# Patient Record
Sex: MALE | Race: WHITE | Employment: FULL TIME | URBAN - METROPOLITAN AREA
[De-identification: names, ages, dates, MRNs, and addresses within clinical notes are randomized per-mention and may not be internally consistent; named-entity substitution may affect disease eponyms.]

---

## 2017-06-28 LAB — DIABETIC RETINOPATHY: NORMAL

## 2019-10-16 ENCOUNTER — HOSPITAL ENCOUNTER (OUTPATIENT)
Dept: ULTRASOUND IMAGING | Age: 49
Discharge: HOME | End: 2019-10-16
Payer: COMMERCIAL

## 2019-10-16 DIAGNOSIS — N20.0 KIDNEY STONE: ICD-10-CM

## 2019-10-16 PROCEDURE — 76775 US EXAM ABDO BACK WALL LIM: CPT

## 2020-06-07 ENCOUNTER — APPOINTMENT (OUTPATIENT)
Dept: GENERAL RADIOLOGY | Age: 50
End: 2020-06-07
Payer: COMMERCIAL

## 2020-06-07 ENCOUNTER — HOSPITAL ENCOUNTER (EMERGENCY)
Age: 50
Discharge: HOME | End: 2020-06-07
Attending: EMERGENCY MEDICINE
Payer: COMMERCIAL

## 2020-06-07 VITALS
TEMPERATURE: 97.7 F | WEIGHT: 185 LBS | HEART RATE: 84 BPM | BODY MASS INDEX: 24.52 KG/M2 | SYSTOLIC BLOOD PRESSURE: 170 MMHG | RESPIRATION RATE: 18 BRPM | OXYGEN SATURATION: 97 % | HEIGHT: 73 IN | DIASTOLIC BLOOD PRESSURE: 94 MMHG

## 2020-06-07 LAB
ALBUMIN SERPL-MCNC: 4.3 GM/DL (ref 3.4–5)
ALP BLD-CCNC: 81 IU/L (ref 40–129)
ALT SERPL-CCNC: 14 U/L (ref 10–40)
ANION GAP SERPL CALCULATED.3IONS-SCNC: 11 MMOL/L (ref 4–16)
AST SERPL-CCNC: 16 IU/L (ref 15–37)
BACTERIA: NEGATIVE /HPF
BASOPHILS ABSOLUTE: 0 K/CU MM
BASOPHILS RELATIVE PERCENT: 0.5 % (ref 0–1)
BETA-HYDROXYBUTYRATE: 1.4 MG/DL (ref 0–3)
BILIRUB SERPL-MCNC: 0.4 MG/DL (ref 0–1)
BILIRUBIN URINE: NEGATIVE MG/DL
BLOOD, URINE: ABNORMAL
BUN BLDV-MCNC: 12 MG/DL (ref 6–23)
CALCIUM SERPL-MCNC: 9 MG/DL (ref 8.3–10.6)
CHLORIDE BLD-SCNC: 100 MMOL/L (ref 99–110)
CLARITY: CLEAR
CO2: 27 MMOL/L (ref 21–32)
COLOR: YELLOW
CREAT SERPL-MCNC: 0.7 MG/DL (ref 0.9–1.3)
DIFFERENTIAL TYPE: ABNORMAL
EOSINOPHILS ABSOLUTE: 0.1 K/CU MM
EOSINOPHILS RELATIVE PERCENT: 2.1 % (ref 0–3)
GFR AFRICAN AMERICAN: >60 ML/MIN/1.73M2
GFR NON-AFRICAN AMERICAN: >60 ML/MIN/1.73M2
GLUCOSE BLD-MCNC: 419 MG/DL (ref 70–99)
GLUCOSE, URINE: >500 MG/DL
HCT VFR BLD CALC: 39.3 % (ref 42–52)
HEMOGLOBIN: 13.4 GM/DL (ref 13.5–18)
IMMATURE NEUTROPHIL %: 1 % (ref 0–0.43)
KETONES, URINE: ABNORMAL MG/DL
LEUKOCYTE ESTERASE, URINE: NEGATIVE
LYMPHOCYTES ABSOLUTE: 1.5 K/CU MM
LYMPHOCYTES RELATIVE PERCENT: 25.3 % (ref 24–44)
MAGNESIUM: 2.4 MG/DL (ref 1.8–2.4)
MCH RBC QN AUTO: 29.1 PG (ref 27–31)
MCHC RBC AUTO-ENTMCNC: 34.1 % (ref 32–36)
MCV RBC AUTO: 85.4 FL (ref 78–100)
MONOCYTES ABSOLUTE: 0.5 K/CU MM
MONOCYTES RELATIVE PERCENT: 7.9 % (ref 0–4)
NITRITE URINE, QUANTITATIVE: NEGATIVE
NUCLEATED RBC %: 0 %
PDW BLD-RTO: 12 % (ref 11.7–14.9)
PH, URINE: 5 (ref 5–8)
PLATELET # BLD: 215 K/CU MM (ref 140–440)
PMV BLD AUTO: 9.7 FL (ref 7.5–11.1)
POTASSIUM SERPL-SCNC: 5 MMOL/L (ref 3.5–5.1)
PRO-BNP: 51.02 PG/ML
PROTEIN UA: NEGATIVE MG/DL
RBC # BLD: 4.6 M/CU MM (ref 4.6–6.2)
RBC URINE: 10 /HPF (ref 0–3)
SEGMENTED NEUTROPHILS ABSOLUTE COUNT: 3.7 K/CU MM
SEGMENTED NEUTROPHILS RELATIVE PERCENT: 63.2 % (ref 36–66)
SODIUM BLD-SCNC: 138 MMOL/L (ref 135–145)
SPECIFIC GRAVITY UA: 1.03 (ref 1–1.03)
TOTAL CK: 89 IU/L (ref 38–174)
TOTAL IMMATURE NEUTOROPHIL: 0.06 K/CU MM
TOTAL NUCLEATED RBC: 0 K/CU MM
TOTAL PROTEIN: 7.4 GM/DL (ref 6.4–8.2)
TRICHOMONAS: ABNORMAL /HPF
TROPONIN T: <0.01 NG/ML
TSH HIGH SENSITIVITY: 1.46 UIU/ML (ref 0.27–4.2)
UROBILINOGEN, URINE: 1 MG/DL (ref 0.2–1)
WBC # BLD: 5.8 K/CU MM (ref 4–10.5)
WBC UA: 1 /HPF (ref 0–2)

## 2020-06-07 PROCEDURE — 84484 ASSAY OF TROPONIN QUANT: CPT

## 2020-06-07 PROCEDURE — 81001 URINALYSIS AUTO W/SCOPE: CPT

## 2020-06-07 PROCEDURE — 80053 COMPREHEN METABOLIC PANEL: CPT

## 2020-06-07 PROCEDURE — 73030 X-RAY EXAM OF SHOULDER: CPT

## 2020-06-07 PROCEDURE — 82010 KETONE BODYS QUAN: CPT

## 2020-06-07 PROCEDURE — 83735 ASSAY OF MAGNESIUM: CPT

## 2020-06-07 PROCEDURE — 85025 COMPLETE CBC W/AUTO DIFF WBC: CPT

## 2020-06-07 PROCEDURE — 93005 ELECTROCARDIOGRAM TRACING: CPT | Performed by: PHYSICIAN ASSISTANT

## 2020-06-07 PROCEDURE — 6370000000 HC RX 637 (ALT 250 FOR IP): Performed by: EMERGENCY MEDICINE

## 2020-06-07 PROCEDURE — 99284 EMERGENCY DEPT VISIT MOD MDM: CPT

## 2020-06-07 PROCEDURE — 83880 ASSAY OF NATRIURETIC PEPTIDE: CPT

## 2020-06-07 PROCEDURE — 93010 ELECTROCARDIOGRAM REPORT: CPT | Performed by: INTERNAL MEDICINE

## 2020-06-07 PROCEDURE — 36415 COLL VENOUS BLD VENIPUNCTURE: CPT

## 2020-06-07 PROCEDURE — 84443 ASSAY THYROID STIM HORMONE: CPT

## 2020-06-07 PROCEDURE — 71045 X-RAY EXAM CHEST 1 VIEW: CPT

## 2020-06-07 PROCEDURE — 82805 BLOOD GASES W/O2 SATURATION: CPT

## 2020-06-07 PROCEDURE — 82550 ASSAY OF CK (CPK): CPT

## 2020-06-07 RX ORDER — LIDOCAINE 4 G/G
1 PATCH TOPICAL DAILY
Status: DISCONTINUED | OUTPATIENT
Start: 2020-06-07 | End: 2020-06-07

## 2020-06-07 RX ORDER — CYCLOBENZAPRINE HCL 10 MG
10 TABLET ORAL ONCE
Status: COMPLETED | OUTPATIENT
Start: 2020-06-07 | End: 2020-06-07

## 2020-06-07 RX ORDER — LIDOCAINE 4 G/G
1 PATCH TOPICAL DAILY
Status: DISCONTINUED | OUTPATIENT
Start: 2020-06-07 | End: 2020-06-07 | Stop reason: HOSPADM

## 2020-06-07 RX ORDER — NAPROXEN 500 MG/1
500 TABLET ORAL 2 TIMES DAILY
Qty: 60 TABLET | Refills: 0 | Status: SHIPPED | OUTPATIENT
Start: 2020-06-07

## 2020-06-07 RX ORDER — ACETAMINOPHEN 500 MG
1000 TABLET ORAL ONCE
Status: COMPLETED | OUTPATIENT
Start: 2020-06-07 | End: 2020-06-07

## 2020-06-07 RX ORDER — ACETAMINOPHEN 325 MG/1
650 TABLET ORAL EVERY 6 HOURS PRN
Qty: 120 TABLET | Refills: 3 | Status: SHIPPED | OUTPATIENT
Start: 2020-06-07

## 2020-06-07 RX ORDER — CYCLOBENZAPRINE HCL 10 MG
10 TABLET ORAL NIGHTLY PRN
Qty: 10 TABLET | Refills: 0 | Status: SHIPPED | OUTPATIENT
Start: 2020-06-07 | End: 2020-06-17

## 2020-06-07 RX ORDER — 0.9 % SODIUM CHLORIDE 0.9 %
1000 INTRAVENOUS SOLUTION INTRAVENOUS ONCE
Status: DISCONTINUED | OUTPATIENT
Start: 2020-06-07 | End: 2020-06-07 | Stop reason: HOSPADM

## 2020-06-07 RX ORDER — NAPROXEN 250 MG/1
500 TABLET ORAL ONCE
Status: COMPLETED | OUTPATIENT
Start: 2020-06-07 | End: 2020-06-07

## 2020-06-07 RX ORDER — LIDOCAINE 50 MG/G
1 PATCH TOPICAL DAILY
Qty: 30 PATCH | Refills: 0 | Status: SHIPPED | OUTPATIENT
Start: 2020-06-07 | End: 2022-06-12

## 2020-06-07 RX ORDER — ASPIRIN 81 MG/1
324 TABLET, CHEWABLE ORAL ONCE
Status: COMPLETED | OUTPATIENT
Start: 2020-06-07 | End: 2020-06-07

## 2020-06-07 RX ADMIN — ASPIRIN 81 MG 324 MG: 81 TABLET ORAL at 12:24

## 2020-06-07 RX ADMIN — NAPROXEN 500 MG: 250 TABLET ORAL at 12:24

## 2020-06-07 RX ADMIN — ACETAMINOPHEN 1000 MG: 500 TABLET ORAL at 12:24

## 2020-06-07 RX ADMIN — CYCLOBENZAPRINE 10 MG: 10 TABLET, FILM COATED ORAL at 12:24

## 2020-06-07 ASSESSMENT — ENCOUNTER SYMPTOMS
GASTROINTESTINAL NEGATIVE: 1
ALLERGIC/IMMUNOLOGIC NEGATIVE: 1
EYES NEGATIVE: 1
RESPIRATORY NEGATIVE: 1

## 2020-06-07 ASSESSMENT — PAIN DESCRIPTION - LOCATION: LOCATION: SHOULDER

## 2020-06-07 ASSESSMENT — PAIN SCALES - GENERAL
PAINLEVEL_OUTOF10: 8
PAINLEVEL_OUTOF10: 8

## 2020-06-07 ASSESSMENT — PAIN DESCRIPTION - DESCRIPTORS: DESCRIPTORS: ACHING

## 2020-06-07 ASSESSMENT — PAIN DESCRIPTION - PAIN TYPE: TYPE: ACUTE PAIN

## 2020-06-07 ASSESSMENT — PAIN DESCRIPTION - ORIENTATION: ORIENTATION: LEFT

## 2020-06-07 NOTE — ED NOTES
Bed: ED-31  Expected date:   Expected time:   Means of arrival:   Comments:  Triage- Ronald Hartman RN  06/07/20 [id number]

## 2020-06-07 NOTE — ED PROVIDER NOTES
[facility]      TRIAGE CHIEF COMPLAINT:   Shoulder Pain      [place]:  Hitesh Glover is a 52 y.o. male that presents with complaint of left shoulder pain. Patient's been having left shoulder pain since Monday after he got back from [place]. He denies any fevers nausea vomiting chest pain shortness of breath weakness numbness tingling trauma headache states it feels like a spasm he is been taking ibuprofen with no relief. Denies other questions or concerns he is right-handed. No other questions or concerns. REVIEW OF SYSTEMS:  At least 10 systems reviewed and otherwise acutely negative except as in the [address] Review of Systems   Constitutional: Negative. HENT: Negative. Eyes: Negative. Respiratory: Negative. Cardiovascular: Negative. Gastrointestinal: Negative. Endocrine: Negative. Genitourinary: Negative. Musculoskeletal: Positive for arthralgias and myalgias. Skin: Negative. Allergic/Immunologic: Negative. Neurological: Negative. Hematological: Negative. Psychiatric/Behavioral: Negative. All other systems reviewed and are negative.       Past Medical History:   Diagnosis Date    Back pain 1990    Diabetes mellitus, type 2 ([facility] [address] 5/14/2014    Hyperlipidemia     Kidney stones 4/2011     Past Surgical History:   Procedure Laterality Date    VASECTOMY  1/2009     Family History   Problem Relation Age of Onset    Diabetes Father     Hypertension Father     Heart Disease Father     Stroke Father     Liver Disease Father     Obesity Father     Glaucoma Father      Social History     Socioeconomic History    Marital status:      Spouse name: Not on file    Number of children: Not on file    Years of education: Not on file    Highest education level: Not on file   Occupational History    Not on file   Social Needs    Financial resource strain: Not on file    Food insecurity     Worry: Not on file     Inability: Not on file   [place] tenderness. Abdominal:      General: Bowel sounds are normal. There is no distension. Palpations: Abdomen is soft. There is no mass. Tenderness: There is no abdominal tenderness. There is no guarding or rebound. Negative signs include Van's sign, Rovsing's sign and McBurney's sign. Hernia: No hernia is present. Musculoskeletal:         General: Tenderness present. No swelling or signs of injury. Left shoulder: He exhibits decreased range of motion, tenderness, swelling, pain and spasm. He exhibits no effusion, no crepitus, no deformity, no laceration, normal pulse and normal strength. Left elbow: Normal.      Left wrist: Normal.      Cervical back: Normal.      Thoracic back: Normal.        Arms:       Right lower leg: No edema. Left lower leg: No edema. Skin:     General: Skin is warm. Coloration: Skin is not jaundiced or pale. Findings: No bruising, erythema, lesion or rash. Neurological:      General: No focal deficit present. Mental Status: He is alert and oriented to person, place, and time. GCS: GCS eye subscore is 4. GCS verbal subscore is 5. GCS motor subscore is 6. Cranial Nerves: Cranial nerves are intact. No cranial nerve deficit, dysarthria or facial asymmetry. Sensory: Sensation is intact. No sensory deficit. Motor: Motor function is intact. No weakness, tremor, atrophy, abnormal muscle tone or seizure activity. Coordination: Coordination is intact. Coordination normal.   Psychiatric:         Mood and Affect: Mood normal.         Behavior: Behavior normal. Behavior is cooperative. Thought Content:  Thought content normal.         Judgment: Judgment normal.           I have reviewed andinterpreted all of the currently available lab results from this visit (if applicable):    Results for orders placed or performed during the hospital encounter of 06/07/20   CBC Auto Differential   Result Value Ref Range    WBC 5.8 4.0 - 10.5 K/CU MM    RBC 4.60 4.6 - 6.2 M/CU MM    Hemoglobin 13.4 (L) 13.5 - 18.0 GM/DL    Hematocrit 39.3 (L) 42 - 52 %    MCV 85.4 78 - 100 FL    MCH 29.1 27 - 31 PG    MCHC 34.1 32.0 - 36.0 %    RDW 12.0 11.7 - 14.9 %    Platelets 632 739 - 475 K/CU MM    MPV 9.7 7.5 - 11.1 FL    Differential Type AUTOMATED DIFFERENTIAL     Segs Relative 63.2 36 - 66 %    Lymphocytes % 25.3 24 - 44 %    Monocytes % 7.9 (H) 0 - 4 %    Eosinophils % 2.1 0 - 3 %    Basophils % 0.5 0 - 1 %    Segs Absolute 3.7 K/CU MM    Lymphocytes Absolute 1.5 K/CU MM    Monocytes Absolute 0.5 K/CU MM    Eosinophils Absolute 0.1 K/CU MM    Basophils Absolute 0.0 K/CU MM    Nucleated RBC % 0.0 %    Total Nucleated RBC 0.0 K/CU MM    Total Immature Neutrophil 0.06 K/CU MM    Immature Neutrophil % 1.0 (H) 0 - 0.43 %   CMP   Result Value Ref Range    Sodium 138 135 - 145 MMOL/L    Potassium 5.0 3.5 - 5.1 MMOL/L    Chloride 100 99 - 110 mMol/L    CO2 27 21 - 32 MMOL/L    BUN 12 6 - 23 MG/DL    CREATININE 0.7 (L) 0.9 - 1.3 MG/DL    Glucose 419 (HH) 70 - 99 MG/DL    Calcium 9.0 8.3 - 10.6 MG/DL    Alb 4.3 3.4 - 5.0 GM/DL    Total Protein 7.4 6.4 - 8.2 GM/DL    Total Bilirubin 0.4 0.0 - 1.0 MG/DL    ALT 14 10 - 40 U/L    AST 16 15 - 37 IU/L    Alkaline Phosphatase 81 40 - 129 IU/L    GFR Non-African American >60 >60 mL/min/1.73m2    GFR African American >60 >60 mL/min/1.73m2    Anion Gap 11 4 - 16   Troponin   Result Value Ref Range    Troponin T <0.010 <0.01 NG/ML   CK   Result Value Ref Range    Total CK 89 38 - 174 IU/L   Brain Natriuretic Peptide   Result Value Ref Range    Pro-BNP 51.02 <300 PG/ML   Magnesium   Result Value Ref Range    Magnesium 2.4 1.8 - 2.4 mg/dl   TSH without Reflex   Result Value Ref Range    TSH, High Sensitivity 1.460 0.270 - 4.20 uIu/ml   Urinalysis   Result Value Ref Range    Color, UA YELLOW YELLOW    Clarity, UA CLEAR CLEAR    Glucose, Urine >500 (A) NEGATIVE MG/DL    Bilirubin Urine NEGATIVE NEGATIVE MG/DL    Ketones, Urine

## 2020-06-17 LAB
EKG ATRIAL RATE: 83 BPM
EKG DIAGNOSIS: NORMAL
EKG P AXIS: 28 DEGREES
EKG P-R INTERVAL: 154 MS
EKG Q-T INTERVAL: 348 MS
EKG QRS DURATION: 86 MS
EKG QTC CALCULATION (BAZETT): 408 MS
EKG R AXIS: 27 DEGREES
EKG T AXIS: 42 DEGREES
EKG VENTRICULAR RATE: 83 BPM

## 2020-09-04 ENCOUNTER — HOSPITAL ENCOUNTER (EMERGENCY)
Age: 50
Discharge: HOME | End: 2020-09-04
Payer: COMMERCIAL

## 2020-09-04 VITALS
HEIGHT: 73 IN | BODY MASS INDEX: 24.39 KG/M2 | OXYGEN SATURATION: 97 % | TEMPERATURE: 97.7 F | DIASTOLIC BLOOD PRESSURE: 89 MMHG | WEIGHT: 184 LBS | HEART RATE: 88 BPM | SYSTOLIC BLOOD PRESSURE: 132 MMHG | RESPIRATION RATE: 14 BRPM

## 2020-09-04 PROCEDURE — 6370000000 HC RX 637 (ALT 250 FOR IP): Performed by: PHYSICIAN ASSISTANT

## 2020-09-04 PROCEDURE — 99282 EMERGENCY DEPT VISIT SF MDM: CPT

## 2020-09-04 RX ORDER — CEPHALEXIN 500 MG/1
500 CAPSULE ORAL 4 TIMES DAILY
Qty: 28 CAPSULE | Refills: 0 | Status: SHIPPED | OUTPATIENT
Start: 2020-09-04 | End: 2020-09-11

## 2020-09-04 RX ORDER — CIPROFLOXACIN HYDROCHLORIDE 3.5 MG/ML
1 SOLUTION/ DROPS TOPICAL
Status: DISCONTINUED | OUTPATIENT
Start: 2020-09-04 | End: 2020-09-04

## 2020-09-04 RX ORDER — NEOMYCIN SULFATE, POLYMYXIN B SULFATE AND HYDROCORTISONE 10; 3.5; 1 MG/ML; MG/ML; [USP'U]/ML
3 SUSPENSION/ DROPS AURICULAR (OTIC) ONCE
Status: COMPLETED | OUTPATIENT
Start: 2020-09-04 | End: 2020-09-04

## 2020-09-04 RX ORDER — CEPHALEXIN 250 MG/1
500 CAPSULE ORAL ONCE
Status: COMPLETED | OUTPATIENT
Start: 2020-09-04 | End: 2020-09-04

## 2020-09-04 RX ADMIN — NEOMYCIN SULFATE, POLYMYXIN B SULFATE AND HYDROCORTISONE 3 DROP: 10; 3.5; 1 SUSPENSION/ DROPS AURICULAR (OTIC) at 02:59

## 2020-09-04 RX ADMIN — CEPHALEXIN 500 MG: 250 CAPSULE ORAL at 02:59

## 2020-09-04 ASSESSMENT — PAIN DESCRIPTION - DESCRIPTORS: DESCRIPTORS: PRESSURE

## 2020-09-04 ASSESSMENT — PAIN DESCRIPTION - LOCATION: LOCATION: EAR

## 2020-09-04 ASSESSMENT — PAIN SCALES - GENERAL: PAINLEVEL_OUTOF10: 2

## 2020-09-04 ASSESSMENT — PAIN DESCRIPTION - PAIN TYPE: TYPE: ACUTE PAIN

## 2020-09-04 ASSESSMENT — PAIN DESCRIPTION - ORIENTATION: ORIENTATION: LEFT

## 2020-09-04 NOTE — ED PROVIDER NOTES
Relationship status: Not on file    Intimate partner violence     Fear of current or ex partner: Not on file     Emotionally abused: Not on file     Physically abused: Not on file     Forced sexual activity: Not on file   Other Topics Concern    Not on file   Social History Narrative    Not on file     Current Facility-Administered Medications   Medication Dose Route Frequency Provider Last Rate Last Dose    triamcinolone acetonide (KENALOG-40) injection 80 mg  80 mg Intra-articular Once Nai Madrid MD         Current Outpatient Medications   Medication Sig Dispense Refill    cephALEXin (KEFLEX) 500 MG capsule Take 1 capsule by mouth 4 times daily for 7 days 28 capsule 0    neomycin-polymyxin-hydrocortisone (CORTISPORIN) 3.5-74166-9 otic solution Place 4 drops into the left ear 3 times daily for 10 days Instill into L Ear 1 each 0    naproxen (NAPROSYN) 500 MG tablet Take 1 tablet by mouth 2 times daily 60 tablet 0    acetaminophen (TYLENOL) 325 MG tablet Take 2 tablets by mouth every 6 hours as needed for Pain 120 tablet 3    lidocaine (LIDODERM) 5 % Place 1 patch onto the skin daily 12 hours on, 12 hours off. 30 patch 0    doxycycline (MONODOX) 100 MG capsule Take 1 capsule by mouth 2 times daily  0    glimepiride (AMARYL) 4 MG tablet Take 1 tablet by mouth every morning 30 tablet 0    metFORMIN (GLUCOPHAGE) 500 MG tablet Take 1 tablet by mouth 2 times daily (with meals) 60 tablet 0    glucose blood VI test strips (FREESTYLE LITE) strip Apply 1 each topically 2 times daily 100 each 11    Lancets MISC Test 3 times per day 100 each 11    naproxen (NAPROSYN) 500 MG tablet Take 1 tablet by mouth 2 times daily (with meals) 60 tablet 0    Alcohol Swabs 70 % PADS Use 3 times per day 100 each 11    glucose monitoring kit (FREESTYLE) monitoring kit 1 kit by Does not apply route daily as needed.  1 kit 0     Allergies   Allergen Reactions    Amoxicillin     Levofloxacin     Sulfa Antibiotics     Sulfate        Nursing Notes Reviewed    Physical Exam:  ED Triage Vitals   Enc Vitals Group      BP 09/04/20 0016 (!) 147/99      Pulse 09/04/20 0016 92      Resp 09/04/20 0016 14      Temp 09/04/20 0016 97.7 °F (36.5 °C)      Temp Source 09/04/20 0016 Oral      SpO2 09/04/20 0016 97 %      Weight 09/04/20 0007 184 lb (83.5 kg)      Height 09/04/20 0007 6' 1\" (1.854 m)      Head Circumference --       Peak Flow --       Pain Score --       Pain Loc --       Pain Edu? --       Excl. in [address]? --      General :Patient is awake alert oriented person place and time no acute distress nontoxic appearing  HEENT: Pupils are equally round and reactive to light extraocular motors are intact conjunctivae clear sclerae white there is no injection no icterus. Nose without any rhinorrhea or epistaxis. Oral mucosa is moist no exudate buccal mucosa shows no ulcerations. Uvula is midline. Left mastoid nontender no tragal tenderness middle ear space purulent. Auditory canal purulent. Mild edema noted. Neck: Neck is supple full range of motion trachea midline thyroid nonpalpable  Cardiac: Heart regular rate rhythm no murmurs rubs clicks or gallops  Lungs: There is no use of accessory muscles no nasal flaring identified. Neuro: Motor intact sensory intact cranial nerves II through XII are intact level of consciousness is normal cerebellar function is normal reflexes are grossly normal. No evidence of incontinence or loss of bowel or bladder no saddle anesthesia noted Lymphatic: There is no submandibular or cervical adenopathy appreciated. I have reviewed and interpreted all of the currently available lab results from this visit (if applicable):  No results found for this visit on 09/04/20.    Radiographs (if obtained):  [] The following radiograph was interpreted by myself in the absence of a radiologist:   [] Radiologist's Report Reviewed:  No orders to display       EKG (if obtained):   Please See Note of attending physician for EKG interpretation. Chart review shows recent radiograph(s):  No results found. MDM:   Patient has otitis externa. Causing him to have decreased acuity likely secondary to purulence. P.o. and topical antibiotics are provided. No mastoiditis no systemic infection. I low suspicion of malignant otitis        I independently managed patient today in the ED        BP (!) 147/99   Pulse 92   Temp 97.7 °F (36.5 °C) (Oral)   Resp 14   Ht 6' 1\" (1.854 m)   Wt 184 lb (83.5 kg)   SpO2 97%   BMI 24.28 kg/m²       Clinical Impression:  1. Infective otitis externa of left ear        Disposition referral (if applicable):  Willam Salcido MD  [id number]  [phone]    In 2 days      Disposition medications (if applicable):  New Prescriptions    CEPHALEXIN (KEFLEX) 500 MG CAPSULE    Take 1 capsule by mouth 4 times daily for 7 days    NEOMYCIN-POLYMYXIN-HYDROCORTISONE (CORTISPORIN) 3.5-99462-2 OTIC SOLUTION    Place 4 drops into the left ear 3 times daily for 10 days Instill into L Ear         Comment: Please note this report has been produced using speech recognition software and may contain errors related to that system including errors in grammar, punctuation, and spelling, as well as words and phrases that may be inappropriate. If there are any questions or concerns please feel free to contact the dictating provider for clarification.       Harjit Guzman, [address] [place] [address]  09/04/20 9548

## 2020-09-04 NOTE — ED PROVIDER NOTES
As the Remote Tele physician-in-triage, I performed a medical screening history and physical exam on this patient remotely via the [address] [place] is a 52 y.o. male with decrease in hearing in left ear x1 day. 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 He denies any pain whatsoever. No cough, congestion or runny nose. No fever or chills. No ringing of the ears. No vertigo/sensation  the room is spinning around him. No history of similar symptoms. He denies any trauma. PHYSICAL EXAM  LMP 01/09/2020     On exam, the patient appears in no acute distress. Speech is clear. Breathing is unlabored. Comment: Please note this report has been produced using speech recognition software and may contain errors related to that system including errors in grammar, punctuation, and spelling, as well as words and phrases that may be inappropriate. If there are any questions or concerns please feel free to contact the dictating provider for clarification.         Shahram Galloway MD  09/04/20 Purvi Rojas

## 2020-09-04 NOTE — ED NOTES
Discharge instructions reviewed. Pt verbalized understanding.        Jesusita Fernando RN  09/04/20 [id number]

## 2022-06-11 ENCOUNTER — APPOINTMENT (OUTPATIENT)
Dept: CT IMAGING | Age: 52
End: 2022-06-11
Payer: COMMERCIAL

## 2022-06-11 ENCOUNTER — HOSPITAL ENCOUNTER (EMERGENCY)
Age: 52
Discharge: HOME | End: 2022-06-12
Payer: COMMERCIAL

## 2022-06-11 VITALS
HEIGHT: 73 IN | OXYGEN SATURATION: 99 % | SYSTOLIC BLOOD PRESSURE: 171 MMHG | HEART RATE: 92 BPM | DIASTOLIC BLOOD PRESSURE: 108 MMHG | RESPIRATION RATE: 14 BRPM | TEMPERATURE: 98.8 F | WEIGHT: 175 LBS | BODY MASS INDEX: 23.19 KG/M2

## 2022-06-11 DIAGNOSIS — R10.9 FLANK PAIN: Primary | ICD-10-CM

## 2022-06-11 LAB
ALBUMIN SERPL-MCNC: 4.3 GM/DL (ref 3.4–5)
ALP BLD-CCNC: 91 IU/L (ref 40–129)
ALT SERPL-CCNC: 11 U/L (ref 10–40)
ANION GAP SERPL CALCULATED.3IONS-SCNC: 10 MMOL/L (ref 4–16)
AST SERPL-CCNC: 12 IU/L (ref 15–37)
BASOPHILS ABSOLUTE: 0 K/CU MM
BASOPHILS RELATIVE PERCENT: 0.3 % (ref 0–1)
BILIRUB SERPL-MCNC: 0.4 MG/DL (ref 0–1)
BUN BLDV-MCNC: 10 MG/DL (ref 6–23)
CALCIUM SERPL-MCNC: 9.5 MG/DL (ref 8.3–10.6)
CHLORIDE BLD-SCNC: 98 MMOL/L (ref 99–110)
CO2: 25 MMOL/L (ref 21–32)
CREAT SERPL-MCNC: 0.8 MG/DL (ref 0.9–1.3)
DIFFERENTIAL TYPE: ABNORMAL
EOSINOPHILS ABSOLUTE: 0.1 K/CU MM
EOSINOPHILS RELATIVE PERCENT: 1.8 % (ref 0–3)
GFR AFRICAN AMERICAN: >60 ML/MIN/1.73M2
GFR NON-AFRICAN AMERICAN: >60 ML/MIN/1.73M2
GLUCOSE BLD-MCNC: 372 MG/DL (ref 70–99)
HCT VFR BLD CALC: 36.1 % (ref 42–52)
HEMOGLOBIN: 12.5 GM/DL (ref 13.5–18)
IMMATURE NEUTROPHIL %: 0.5 % (ref 0–0.43)
LYMPHOCYTES ABSOLUTE: 2.1 K/CU MM
LYMPHOCYTES RELATIVE PERCENT: 35.3 % (ref 24–44)
MCH RBC QN AUTO: 29.4 PG (ref 27–31)
MCHC RBC AUTO-ENTMCNC: 34.6 % (ref 32–36)
MCV RBC AUTO: 84.9 FL (ref 78–100)
MONOCYTES ABSOLUTE: 0.5 K/CU MM
MONOCYTES RELATIVE PERCENT: 8.1 % (ref 0–4)
NUCLEATED RBC %: 0 %
PDW BLD-RTO: 12.4 % (ref 11.7–14.9)
PLATELET # BLD: 249 K/CU MM (ref 140–440)
PMV BLD AUTO: 9.4 FL (ref 7.5–11.1)
POTASSIUM SERPL-SCNC: 3.9 MMOL/L (ref 3.5–5.1)
RBC # BLD: 4.25 M/CU MM (ref 4.6–6.2)
SEGMENTED NEUTROPHILS ABSOLUTE COUNT: 3.3 K/CU MM
SEGMENTED NEUTROPHILS RELATIVE PERCENT: 54 % (ref 36–66)
SODIUM BLD-SCNC: 133 MMOL/L (ref 135–145)
TOTAL IMMATURE NEUTOROPHIL: 0.03 K/CU MM
TOTAL NUCLEATED RBC: 0 K/CU MM
TOTAL PROTEIN: 7.3 GM/DL (ref 6.4–8.2)
WBC # BLD: 6 K/CU MM (ref 4–10.5)

## 2022-06-11 PROCEDURE — 99284 EMERGENCY DEPT VISIT MOD MDM: CPT

## 2022-06-11 PROCEDURE — 6360000002 HC RX W HCPCS: Performed by: PHYSICIAN ASSISTANT

## 2022-06-11 PROCEDURE — 96375 TX/PRO/DX INJ NEW DRUG ADDON: CPT

## 2022-06-11 PROCEDURE — 74176 CT ABD & PELVIS W/O CONTRAST: CPT

## 2022-06-11 PROCEDURE — 2580000003 HC RX 258: Performed by: PHYSICIAN ASSISTANT

## 2022-06-11 PROCEDURE — 80053 COMPREHEN METABOLIC PANEL: CPT

## 2022-06-11 PROCEDURE — 85025 COMPLETE CBC W/AUTO DIFF WBC: CPT

## 2022-06-11 PROCEDURE — 96374 THER/PROPH/DIAG INJ IV PUSH: CPT

## 2022-06-11 RX ORDER — KETOROLAC TROMETHAMINE 30 MG/ML
30 INJECTION, SOLUTION INTRAMUSCULAR; INTRAVENOUS ONCE
Status: COMPLETED | OUTPATIENT
Start: 2022-06-11 | End: 2022-06-11

## 2022-06-11 RX ORDER — 0.9 % SODIUM CHLORIDE 0.9 %
1000 INTRAVENOUS SOLUTION INTRAVENOUS ONCE
Status: COMPLETED | OUTPATIENT
Start: 2022-06-11 | End: 2022-06-11

## 2022-06-11 RX ORDER — ONDANSETRON 2 MG/ML
4 INJECTION INTRAMUSCULAR; INTRAVENOUS ONCE
Status: COMPLETED | OUTPATIENT
Start: 2022-06-11 | End: 2022-06-11

## 2022-06-11 RX ORDER — MORPHINE SULFATE 4 MG/ML
4 INJECTION, SOLUTION INTRAMUSCULAR; INTRAVENOUS
Status: DISCONTINUED | OUTPATIENT
Start: 2022-06-11 | End: 2022-06-12 | Stop reason: HOSPADM

## 2022-06-11 RX ADMIN — KETOROLAC TROMETHAMINE 30 MG: 30 INJECTION, SOLUTION INTRAMUSCULAR; INTRAVENOUS at 22:35

## 2022-06-11 RX ADMIN — SODIUM CHLORIDE 1000 ML: 9 INJECTION, SOLUTION INTRAVENOUS at 22:40

## 2022-06-11 RX ADMIN — ONDANSETRON 4 MG: 2 INJECTION INTRAMUSCULAR; INTRAVENOUS at 22:35

## 2022-06-11 ASSESSMENT — PAIN DESCRIPTION - ORIENTATION
ORIENTATION: LEFT

## 2022-06-11 ASSESSMENT — PAIN DESCRIPTION - LOCATION
LOCATION: FLANK

## 2022-06-11 ASSESSMENT — PAIN SCALES - GENERAL
PAINLEVEL_OUTOF10: 7
PAINLEVEL_OUTOF10: 3
PAINLEVEL_OUTOF10: 7

## 2022-06-11 ASSESSMENT — PAIN DESCRIPTION - DESCRIPTORS
DESCRIPTORS: ACHING
DESCRIPTORS: ACHING
DESCRIPTORS: STABBING

## 2022-06-11 ASSESSMENT — PAIN DESCRIPTION - PAIN TYPE
TYPE: ACUTE PAIN
TYPE: ACUTE PAIN

## 2022-06-11 ASSESSMENT — PAIN DESCRIPTION - FREQUENCY: FREQUENCY: INTERMITTENT

## 2022-06-12 LAB
BACTERIA: NEGATIVE /HPF
BILIRUBIN URINE: NEGATIVE MG/DL
BLOOD, URINE: ABNORMAL
CLARITY: CLEAR
COLOR: YELLOW
GLUCOSE, URINE: >1000 MG/DL
KETONES, URINE: NEGATIVE MG/DL
LEUKOCYTE ESTERASE, URINE: NEGATIVE
MUCUS: ABNORMAL HPF
NITRITE URINE, QUANTITATIVE: NEGATIVE
PH, URINE: 6 (ref 5–8)
PROTEIN UA: ABNORMAL MG/DL
RBC URINE: 2 /HPF (ref 0–3)
SPECIFIC GRAVITY UA: 1.02 (ref 1–1.03)
TRICHOMONAS: ABNORMAL /HPF
UROBILINOGEN, URINE: 1 MG/DL (ref 0.2–1)
WBC UA: 1 /HPF (ref 0–2)

## 2022-06-12 PROCEDURE — 81001 URINALYSIS AUTO W/SCOPE: CPT

## 2022-06-12 RX ORDER — LIDOCAINE 50 MG/G
1 PATCH TOPICAL DAILY
Qty: 30 PATCH | Refills: 0 | Status: SHIPPED | OUTPATIENT
Start: 2022-06-12 | End: 2022-06-12 | Stop reason: SDUPTHER

## 2022-06-12 RX ORDER — KETOROLAC TROMETHAMINE 10 MG/1
10 TABLET, FILM COATED ORAL EVERY 6 HOURS PRN
Qty: 30 TABLET | Refills: 0 | Status: SHIPPED | OUTPATIENT
Start: 2022-06-12

## 2022-06-12 RX ORDER — CYCLOBENZAPRINE HCL 10 MG
10 TABLET ORAL 3 TIMES DAILY PRN
Qty: 15 TABLET | Refills: 0 | Status: SHIPPED | OUTPATIENT
Start: 2022-06-12 | End: 2022-06-12 | Stop reason: SDUPTHER

## 2022-06-12 RX ORDER — LIDOCAINE 50 MG/G
1 PATCH TOPICAL DAILY
Qty: 30 PATCH | Refills: 0 | Status: SHIPPED | OUTPATIENT
Start: 2022-06-12 | End: 2022-07-12

## 2022-06-12 RX ORDER — KETOROLAC TROMETHAMINE 10 MG/1
10 TABLET, FILM COATED ORAL EVERY 6 HOURS PRN
Qty: 30 TABLET | Refills: 0 | Status: SHIPPED | OUTPATIENT
Start: 2022-06-12 | End: 2022-06-12 | Stop reason: SDUPTHER

## 2022-06-12 RX ORDER — CYCLOBENZAPRINE HCL 10 MG
10 TABLET ORAL 3 TIMES DAILY PRN
Qty: 15 TABLET | Refills: 0 | Status: SHIPPED | OUTPATIENT
Start: 2022-06-12

## 2022-06-12 NOTE — ED PROVIDER NOTES
eMERGENCY dEPARTMENT eNCOUnter        [address] [facility]    Chief Complaint   Patient presents with    Flank Pain     Left, x4 days       HPI    Yvonne Samson is a 50 y.o. male who presents with flank pain. Onset was 4 days ago. Context was spontaneous onset--denies any fall, heavy lifting, injury. Constant since onset. Localized to the left flank. Characterized as aching stabbing. Aggravating factors:  none. Alleviating factors:  None--has been taking aspirin without relief. Severity is a 7 on a scale of 0-10. Patient denies fever, chills, n/v/d, or urinary symptoms. Denies radiation down the legs, numbness, tingling, weakness. He states he thought the pain was musculoskeletal but does have a history of kidney stones about 10 years ago, so he decided he should come in for evaluation. REVIEW OF SYSTEMS    See HPI for further details. Review of systems otherwise negative. Constitutional:  Denies fever. HENT:  Denies headache, denies dizziness/lightheadedness. Respiratory:  Denies shortness of breath. Cardiovascular:  Denies chest pain. GI:  Denies abdominal pain, denies nausea, denies vomiting, denies diarrhea. :  + flank pain. Denies dysuria, denies frequency, denies urgency, denies hematuria. Musculoskeletal:  Denies edema. PAST MEDICAL HISTORY    Past Medical History:   Diagnosis Date    Back pain 1990    Diabetes mellitus, type 2 ([facility] [address] 5/14/2014    Hyperlipidemia     Kidney stones 4/2011       SURGICAL HISTORY    Past Surgical History:   Procedure Laterality Date    VASECTOMY  1/2009       CURRENT MEDICATIONS    Current Outpatient Rx   Medication Sig Dispense Refill    naproxen (NAPROSYN) 500 MG tablet Take 1 tablet by mouth 2 times daily 60 tablet 0    acetaminophen (TYLENOL) 325 MG tablet Take 2 tablets by mouth every 6 hours as needed for Pain 120 tablet 3    lidocaine (LIDODERM) 5 % Place 1 patch onto the skin daily 12 hours on, 12 hours off.  30 patch 0    doxycycline (MONODOX) 100 MG capsule Take 1 capsule by mouth 2 times daily  0    glimepiride (AMARYL) 4 MG tablet Take 1 tablet by mouth every morning 30 tablet 0    metFORMIN (GLUCOPHAGE) 500 MG tablet Take 1 tablet by mouth 2 times daily (with meals) 60 tablet 0    glucose blood VI test strips (FREESTYLE LITE) strip Apply 1 each topically 2 times daily 100 each 11    Lancets MISC Test 3 times per day 100 each 11    naproxen (NAPROSYN) 500 MG tablet Take 1 tablet by mouth 2 times daily (with meals) 60 tablet 0    Alcohol Swabs 70 % PADS Use 3 times per day 100 each 11    glucose monitoring kit (FREESTYLE) monitoring kit 1 kit by Does not apply route daily as needed. 1 kit 0       ALLERGIES    Allergies   Allergen Reactions    Amoxicillin     Levofloxacin     Sulfa Antibiotics     Sulfate        FAMILY HISTORY    Family History   Problem Relation Age of Onset    Diabetes Father     Hypertension Father     Heart Disease Father     Stroke Father     Liver Disease Father     Obesity Father     Glaucoma Father        SOCIAL HISTORY    Social History     Socioeconomic History    Marital status:      Spouse name: None    Number of children: None    Years of education: None    Highest education level: None   Occupational History    None   Tobacco Use    Smoking status: Never Smoker    Smokeless tobacco: Never Used   Substance and Sexual Activity    Alcohol use: None    Drug use: Never    Sexual activity: None   Other Topics Concern    None   Social History Narrative    None     Social Determinants of Health     Financial Resource Strain:     Difficulty of Paying Living Expenses: Not on file   Food Insecurity:     Worried About Running Out of Food in the Last Year: Not on file    Sourav of Food in the Last Year: Not on file   Transportation Needs:     Lack of Transportation (Medical): Not on file    Lack of Transportation (Non-Medical):  Not on file   Physical Activity:     Days of Exercise per Week: Not on file    Minutes of Exercise per Session: Not on file   Stress:     Feeling of Stress : Not on file   Social Connections:     Frequency of Communication with Friends and Family: Not on file    Frequency of Social Gatherings with Friends and Family: Not on file    Attends Restoration Services: Not on file    Active Member of [address] [place] [address] [employer] or Organizations: Not on file    Attends Club or Organization Meetings: Not on file    Marital Status: Not on file   Intimate Partner Violence:     Fear of Current or Ex-Partner: Not on file    Emotionally Abused: Not on file    Physically Abused: Not on file    Sexually Abused: Not on file   Housing Stability:     Unable to Pay for Housing in the Last Year: Not on file    Number of [place] in the Last Year: Not on file    Unstable Housing in the Last Year: Not on file       PHYSICAL EXAM    VITAL SIGNS: BP (!) 171/108   Pulse 92   Temp 98.8 °F (37.1 °C) (Oral)   Resp 14   Ht 6' 1\" (1.854 m)   Wt 175 lb (79.4 kg)   SpO2 99%   BMI 23.09 kg/m²   Constitutional:  Well developed, well nourished, no acute distress, non-toxic appearance   Eyes:  PERRL, EOMI. Conjunctiva normal.  HENT:  NC/AT. Ears, nose, mouth normal.  Respiratory:  Lungs CTAB. Cardiovascular:  RRR. GI:  Abdomen soft, non-tender, BS active. :  Left CVA tenderness. Musculoskeletal:  No edema, no tenderness. Integument:  Well hydrated. RADIOLOGY/PROCEDURES    CT ABDOMEN PELVIS WO CONTRAST Additional Contrast? None    Result Date: 6/11/2022  1. No acute intra-abdominal abnormality. 2. Normal appendix. 3. No renal or ureteral calculi.      Labs Reviewed   CBC WITH AUTO DIFFERENTIAL - Abnormal; Notable for the following components:       Result Value    RBC 4.25 (*)     Hemoglobin 12.5 (*)     Hematocrit 36.1 (*)     Monocytes % 8.1 (*)     Immature Neutrophil % 0.5 (*)     All other components within normal limits   COMPREHENSIVE METABOLIC PANEL W/ REFLEX TO MG FOR LOW K - Abnormal; Notable for the following components:    Sodium 133 (*)     Chloride 98 (*)     CREATININE 0.8 (*)     Glucose 372 (*)     AST 12 (*)     All other components within normal limits   URINALYSIS WITH REFLEX TO CULTURE - Abnormal; Notable for the following components:    Glucose, Urine >1,000 (*)     Blood, Urine SMALL (*)     Protein, UA TRACE (*)     Mucus, UA RARE (*)     All other components within normal limits     ED COURSE & MEDICAL DECISION MAKING    Pertinent Labs & Imaging studies reviewed. (See chart for details)  -  Patient seen and evaluated in the emergency department. -  Triage and nursing notes reviewed and incorporated. -  Old chart records reviewed and incorporated. -  Supervising physician was Dr. Myrna Mott. Patient was seen independently. -  Differential diagnosis includes: nephrolithiasis, pyelonephritis/uti, renal cell carcinoma, renal abscess, renal infarct, musculoskeletal back pain, pneumonia, others  -  Work-up included: see above  -  Patient treated with NS, Toradol, Zofran, morphine in the ED.  -  Results discussed with patient. No obstructive uropathy or abnormality on CT. Labs and UA unremarkable. Will dc home with Toradol, Flexeril, Lidoderm patches. Ice/heat/stretches. FU with PCP or return as needed. He is agreeable with plan of care and disposition.  -  Patient dc home. In light of current events, I did utilize appropriate PPE (including N95 and surgical face mask, safety glasses, and gloves, as recommended by the health facility/national standard best practice, during my bedside interactions with the patient. FINAL IMPRESSION    1.  Flank pain              Jose Alberto Leal PA-C  06/12/22 0139

## 2022-09-07 ENCOUNTER — HOSPITAL ENCOUNTER (EMERGENCY)
Age: 52
Discharge: HOME | End: 2022-09-07
Payer: COMMERCIAL

## 2022-09-07 ENCOUNTER — APPOINTMENT (OUTPATIENT)
Dept: ULTRASOUND IMAGING | Age: 52
End: 2022-09-07
Payer: COMMERCIAL

## 2022-09-07 VITALS
TEMPERATURE: 97.5 F | OXYGEN SATURATION: 98 % | BODY MASS INDEX: 23.19 KG/M2 | WEIGHT: 175 LBS | RESPIRATION RATE: 16 BRPM | SYSTOLIC BLOOD PRESSURE: 164 MMHG | HEART RATE: 74 BPM | DIASTOLIC BLOOD PRESSURE: 87 MMHG | HEIGHT: 73 IN

## 2022-09-07 DIAGNOSIS — R03.0 ELEVATED BLOOD PRESSURE READING WITHOUT DIAGNOSIS OF HYPERTENSION: ICD-10-CM

## 2022-09-07 DIAGNOSIS — E11.65 HYPERGLYCEMIA DUE TO DIABETES MELLITUS (HCC): ICD-10-CM

## 2022-09-07 DIAGNOSIS — R59.0 INGUINAL LYMPHADENOPATHY: ICD-10-CM

## 2022-09-07 DIAGNOSIS — R20.2 LEG PARESTHESIA: ICD-10-CM

## 2022-09-07 DIAGNOSIS — M79.605 LEFT LEG PAIN: Primary | ICD-10-CM

## 2022-09-07 LAB
ALBUMIN SERPL-MCNC: 4.2 GM/DL (ref 3.4–5)
ALP BLD-CCNC: 95 IU/L (ref 40–129)
ALT SERPL-CCNC: 10 U/L (ref 10–40)
ANION GAP SERPL CALCULATED.3IONS-SCNC: 7 MMOL/L (ref 4–16)
AST SERPL-CCNC: 12 IU/L (ref 15–37)
BASOPHILS ABSOLUTE: 0 K/CU MM
BASOPHILS RELATIVE PERCENT: 0.5 % (ref 0–1)
BILIRUB SERPL-MCNC: 0.4 MG/DL (ref 0–1)
BUN BLDV-MCNC: 10 MG/DL (ref 6–23)
CALCIUM SERPL-MCNC: 9.5 MG/DL (ref 8.3–10.6)
CHLORIDE BLD-SCNC: 100 MMOL/L (ref 99–110)
CO2: 27 MMOL/L (ref 21–32)
CREAT SERPL-MCNC: 0.6 MG/DL (ref 0.9–1.3)
DIFFERENTIAL TYPE: ABNORMAL
EOSINOPHILS ABSOLUTE: 0.2 K/CU MM
EOSINOPHILS RELATIVE PERCENT: 2.7 % (ref 0–3)
GFR AFRICAN AMERICAN: >60 ML/MIN/1.73M2
GFR NON-AFRICAN AMERICAN: >60 ML/MIN/1.73M2
GLUCOSE BLD-MCNC: 324 MG/DL (ref 70–99)
HCT VFR BLD CALC: 35.8 % (ref 42–52)
HEMOGLOBIN: 12.4 GM/DL (ref 13.5–18)
IMMATURE NEUTROPHIL %: 0.5 % (ref 0–0.43)
LYMPHOCYTES ABSOLUTE: 1.9 K/CU MM
LYMPHOCYTES RELATIVE PERCENT: 28.3 % (ref 24–44)
MAGNESIUM: 2 MG/DL (ref 1.8–2.4)
MCH RBC QN AUTO: 28.8 PG (ref 27–31)
MCHC RBC AUTO-ENTMCNC: 34.6 % (ref 32–36)
MCV RBC AUTO: 83.1 FL (ref 78–100)
MONOCYTES ABSOLUTE: 0.5 K/CU MM
MONOCYTES RELATIVE PERCENT: 8.1 % (ref 0–4)
NUCLEATED RBC %: 0 %
PDW BLD-RTO: 12.2 % (ref 11.7–14.9)
PLATELET # BLD: 227 K/CU MM (ref 140–440)
PMV BLD AUTO: 9.9 FL (ref 7.5–11.1)
POTASSIUM SERPL-SCNC: 4 MMOL/L (ref 3.5–5.1)
RBC # BLD: 4.31 M/CU MM (ref 4.6–6.2)
SEGMENTED NEUTROPHILS ABSOLUTE COUNT: 3.9 K/CU MM
SEGMENTED NEUTROPHILS RELATIVE PERCENT: 59.9 % (ref 36–66)
SODIUM BLD-SCNC: 134 MMOL/L (ref 135–145)
TOTAL CK: 58 IU/L (ref 38–174)
TOTAL IMMATURE NEUTOROPHIL: 0.03 K/CU MM
TOTAL NUCLEATED RBC: 0 K/CU MM
TOTAL PROTEIN: 7 GM/DL (ref 6.4–8.2)
WBC # BLD: 6.6 K/CU MM (ref 4–10.5)

## 2022-09-07 PROCEDURE — 82550 ASSAY OF CK (CPK): CPT

## 2022-09-07 PROCEDURE — 93971 EXTREMITY STUDY: CPT

## 2022-09-07 PROCEDURE — 83735 ASSAY OF MAGNESIUM: CPT

## 2022-09-07 PROCEDURE — 99284 EMERGENCY DEPT VISIT MOD MDM: CPT

## 2022-09-07 PROCEDURE — 85025 COMPLETE CBC W/AUTO DIFF WBC: CPT

## 2022-09-07 PROCEDURE — 80053 COMPREHEN METABOLIC PANEL: CPT

## 2022-09-07 ASSESSMENT — PAIN - FUNCTIONAL ASSESSMENT: PAIN_FUNCTIONAL_ASSESSMENT: NONE - DENIES PAIN

## 2022-09-07 NOTE — ED NOTES
Patient discharged to home at this time. Discharge instructions and follow up care discussed, patient voices understanding.       FABIOLA Hays  09/07/22 7993

## 2022-09-07 NOTE — ED PROVIDER NOTES
EMERGENCY DEPARTMENT ENCOUNTER    [facility] EMERGENCY DEPARTMENT        TRIAGE CHIEF COMPLAINT:   Leg Swelling (left leg edema; noticed the increase swelling below the knee this morning)      [place]:  [place] is a 50 y.o. male that presents with left calf/lower leg swelling. Onset is prior to arrival, several days ago but worse overnight. Context is patient states for last several days, she has been having a \"heavy\" pins and needle sensation in the left calf radiating into the left foot with \"charley horses. \"  Denies any trauma or injury. States that he woke this morning and noticed some asymmetry/swelling of the left calf compared to the right was concerned for DVT. Patient has no personal history for DVT. No recent long travel hospitalizations, surgeries, history of malignancy or known history of bleeding/clotting disorders. No proximal knee or hip pain. Denying any back pain saddle paresthesia, bowel incontinence or bladder retention. Does have a history of \"borderline diabetes\" but is not on any insulin regimen at this time. No history of diabetic neuropathy. No fever chills chest pain shortness of breath. Does not have any underlying history for CHF, is not on diuretics. At rest, patient denies any pain but does have the numbness and tingling sensation in the left lower leg.     ROS:  General:  No fevers, no chills   Cardiovascular:  No chest pain, no palpitations  Respiratory:  No shortness of breath, no cough, no wheezing  Gastrointestinal:  No pain, no nausea, no vomiting, no diarrhea  Musculoskeletal:  See HPI  Skin:  No rash, no pruritis, no easy bruising  Neurologic:  No speech problems, no headache, no extremity numbness, no extremity tingling, no extremity weakness  Psychiatric:  No anxiety  Genitourinary:  No dysuria, no hematuria  Extremities:  See HPI    Past Medical History:   Diagnosis Date    Back pain 1990    Diabetes mellitus, type 2 ([address] 5/14/2014    Hyperlipidemia     Kidney stones 4/2011     Past Surgical History:   Procedure Laterality Date    VASECTOMY  1/2009     Family History   Problem Relation Age of Onset    Diabetes Father     Hypertension Father     Heart Disease Father     Stroke Father     Liver Disease Father     Obesity Father     Glaucoma Father      Social History     Socioeconomic History    Marital status:      Spouse name: Not on file    Number of children: Not on file    Years of education: Not on file    Highest education level: Not on file   Occupational History    Not on file   Tobacco Use    Smoking status: Never    Smokeless tobacco: Never   Substance and Sexual Activity    Alcohol use: Not on file    Drug use: Never    Sexual activity: Not on file   Other Topics Concern    Not on file   Social History Narrative    Not on file     Social Determinants of Health     Financial Resource Strain: Not on file   Food Insecurity: Not on file   Transportation Needs: Not on file   Physical Activity: Not on file   Stress: Not on file   Social Connections: Not on file   Intimate Partner Violence: Not on file   Housing Stability: Not on file     Current Facility-Administered Medications   Medication Dose Route Frequency Provider Last Rate Last Admin    triamcinolone acetonide (KENALOG-40) injection 80 mg  80 mg Intra-artICUlar Once [place] MD Jen         Current Outpatient Medications   Medication Sig Dispense Refill    cyclobenzaprine (FLEXERIL) 10 mg tablet Take 1 tablet by mouth 3 times daily as needed for Muscle spasms 15 tablet 0    ketorolac (TORADOL) 10 MG tablet Take 1 tablet by mouth every 6 hours as needed for Pain 30 tablet 0    naproxen (NAPROSYN) 500 MG tablet Take 1 tablet by mouth 2 times daily 60 tablet 0    acetaminophen (TYLENOL) 325 MG tablet Take 2 tablets by mouth every 6 hours as needed for Pain 120 tablet 3    doxycycline (MONODOX) 100 MG capsule Take 1 capsule by mouth 2 times daily  0 glimepiride (AMARYL) 4 MG tablet Take 1 tablet by mouth every morning 30 tablet 0    metFORMIN (GLUCOPHAGE) 500 MG tablet Take 1 tablet by mouth 2 times daily (with meals) 60 tablet 0    glucose blood VI test strips (FREESTYLE LITE) strip Apply 1 each topically 2 times daily 100 each 11    Lancets MISC Test 3 times per day 100 each 11    naproxen (NAPROSYN) 500 MG tablet Take 1 tablet by mouth 2 times daily (with meals) 60 tablet 0    Alcohol Swabs 70 % PADS Use 3 times per day 100 each 11    glucose monitoring kit (FREESTYLE) monitoring kit 1 kit by Does not apply route daily as needed. 1 kit 0     Allergies   Allergen Reactions    Amoxicillin     Levofloxacin     Sulfa Antibiotics     Sulfate        Nursing Notes Reviewed  PHYSICAL EXAM    VITAL SIGNS: BP (!) 164/87   Pulse 74   Temp 97.5 °F (36.4 °C) (Oral)   Resp 16   Ht 6' 1\" (1.854 m)   Wt 175 lb (79.4 kg)   SpO2 98%   BMI 23.09 kg/m²    Constitutional:  Well developed, Well nourished, In no acute distress  Head:  Normocephalic, Atraumatic  Eyes:  EOMI. Sclera clear. Conjunctiva normal, No discharge. Neck/Lymphatics: Supple, no JVD, No lymphadenopathy  Cardiovascular:  RRR, Normal S1 & S2     Peripheral Vascular: Distal pulses 2+, Capillary refill <2seconds  Respiratory:  Respirations nonnlabored, Clear to auscultation bilaterally, No retractions  Abdomen: Bowel sounds normal in all quadrants, Soft, Non tender/Nondistended, No palpable abdominal masses. Musculoskeletal:  No gross bony deformities of the left lower leg. No skin changes redness or warmth. There is mild asymmetry of left calf compared to the right without pitting edema. There is tenderness palpation of the left calf and popliteal left knee without firm nodules, woody sensation. No localized joint tenderness or palpable joint effusion to the knee or ankle.   Full range of motion throughout the left lower leg including 5/5 strength on knee and ankle range of motion against resistance. Extensor mechanism knee is grossly intact. No joint laxity. Pedal pulses 2+. Brisk capillary refill. Subjective tingling throughout the left lower leg without gross foot drop. Integument:  Warm, Dry, Intact, Skin turgor and texture normal  Neurologic:  Alert & oriented x3 , No focal deficits noted. Cranial nerves II through XII grossly intact. No slurred speech. No facial droop. Normal gross motor coordination & motor strength bilateral upper and lower extremities.     Psychiatric:  Affect appropriate      I have reviewed and interpreted all of the currently available lab results from this visit (if applicable):  Results for orders placed or performed during the hospital encounter of 09/07/22   CBC with Auto Differential   Result Value Ref Range    WBC 6.6 4.0 - 10.5 K/CU MM    RBC 4.31 (L) 4.6 - 6.2 M/CU MM    Hemoglobin 12.4 (L) 13.5 - 18.0 GM/DL    Hematocrit 35.8 (L) 42 - 52 %    MCV 83.1 78 - 100 FL    MCH 28.8 27 - 31 PG    MCHC 34.6 32.0 - 36.0 %    RDW 12.2 11.7 - 14.9 %    Platelets 124 256 - 633 K/CU MM    MPV 9.9 7.5 - 11.1 FL    Differential Type AUTOMATED DIFFERENTIAL     Segs Relative 59.9 36 - 66 %    Lymphocytes % 28.3 24 - 44 %    Monocytes % 8.1 (H) 0 - 4 %    Eosinophils % 2.7 0 - 3 %    Basophils % 0.5 0 - 1 %    Segs Absolute 3.9 K/CU MM    Lymphocytes Absolute 1.9 K/CU MM    Monocytes Absolute 0.5 K/CU MM    Eosinophils Absolute 0.2 K/CU MM    Basophils Absolute 0.0 K/CU MM    Nucleated RBC % 0.0 %    Total Nucleated RBC 0.0 K/CU MM    Total Immature Neutrophil 0.03 K/CU MM    Immature Neutrophil % 0.5 (H) 0 - 0.43 %   Comprehensive Metabolic Panel   Result Value Ref Range    Sodium 134 (L) 135 - 145 MMOL/L    Potassium 4.0 3.5 - 5.1 MMOL/L    Chloride 100 99 - 110 mMol/L    CO2 27 21 - 32 MMOL/L    BUN 10 6 - 23 MG/DL    Creatinine 0.6 (L) 0.9 - 1.3 MG/DL    Glucose 324 (H) 70 - 99 MG/DL    Calcium 9.5 8.3 - 10.6 MG/DL    Albumin 4.2 3.4 - 5.0 GM/DL    Total Protein 7.0 6.4 - 8.2 GM/DL    Total Bilirubin 0.4 0.0 - 1.0 MG/DL    ALT 10 10 - 40 U/L    AST 12 (L) 15 - 37 IU/L    Alkaline Phosphatase 95 40 - 129 IU/L    GFR Non-African American >60 >60 mL/min/1.73m2    GFR African American >60 >60 mL/min/1.73m2    Anion Gap 7 4 - 16   CK   Result Value Ref Range    Total CK 58 38 - 174 IU/L   Magnesium   Result Value Ref Range    Magnesium 2.0 1.8 - 2.4 mg/dl        Radiographs (if obtained):  [] The following radiograph was interpreted by myself in the absence of a radiologist:   [] Radiologist's Report Reviewed:  VL DUP LOWER EXTREMITY VENOUS LEFT   Final Result   1. No evidence for deep venous thrombosis of the left lower extremity. 2. Enlarged left inguinal lymph node. This likely is inflammatory. Neoplastic process is less likely as there was no adenopathy noted on a prior   CT scan dated 06/11/2022.  DUP LOWER EXTREMITY VENOUS LEFT (Final result)  Result time 09/07/22 08:50:56  Final result by Rich Abrams MD (09/07/22 08:50:56)                Impression:    1. No evidence for deep venous thrombosis of the left lower extremity. 2. Enlarged left inguinal lymph node. This likely is inflammatory. Neoplastic process is less likely as there was no adenopathy noted on a prior   CT scan dated 06/11/2022. Narrative:    EXAMINATION:   DUPLEX VENOUS ULTRASOUND OF THE LEFT LOWER EXTREMITY     9/7/2022 7:47 am     TECHNIQUE:   Duplex ultrasound using B-mode/gray scaled imaging and Doppler spectral   analysis and color flow was obtained of the deep venous structures of the   left extremity. COMPARISON:   None. HISTORY:   ORDERING SYSTEM PROVIDED HISTORY: left calf pain   TECHNOLOGIST PROVIDED HISTORY:   Reason for exam:->left calf pain     FINDINGS:   The visualized veins of the left lower extremity are patent and free of   echogenic thrombus. The veins demonstrate good compressibility with normal   color flow study and spectral analysis.   There is an enlarged left inguinal   lymph node measuring 2.9 x 2.3 x 1.1 cm. Chart review shows recent radiographs:  No results found. ED COURSE & MEDICAL DECISION MAKING       Vital signs and nursing notes reviewed during ED course. I have independently evaluated this patient . Supervising physician - Dr. Sarah Hartmann - was present in ED and available for consultation throughout entirety of patient's care. All pertinent Lab data and radiographic results reviewed with patient at bedside. The patient and/or the family were informed of the results of any tests/labs/imaging, the treatment plan, and time was allotted to answer questions. Clinical Impression:  1. Left leg pain    2. Elevated blood pressure reading without diagnosis of hypertension    3. Hyperglycemia due to diabetes mellitus ([address]    4. Inguinal lymphadenopathy    5. Leg paresthesia        Patient presents with left calf pain, swelling and \"charley horses. \"  On exam, pleasant nontoxic 57-year-old male, afebrile and in no acute respiratory distress. Noted be hypertensive 178/118 without diagnosed history for hypertension. Currently denying any chest pain shortness of breath, headache dizziness or lightheadedness. On exam, there is mild asymmetry of the left lower leg/calf compared to the right without skin changes redness or warmth. Left calf is tender to palpation without firm or woody sensation or significant pre to pitting edema. No range of motion deficits in the left lower leg, patient is neurovascular intact throughout the left lower leg with soft compartments, no dropfoot but there is subjective paresthesias to palpation. No open wounds. Reassuring. Normal white count, hemoglobin is 12.4. CMP with a glucose of 324 with otherwise normal CO2 and anion gap patient does not appear to be in DKA. CK magnesium.   Venous Doppler left lower leg shows no evidence of acute DVT however there is finding for enlarged left inguinal lymph node which is likely inflammatory, less likely concerning for neoplastic process as there is no adenopathy noted on previous CT imaging in June 2022. On my assessment of patient's left groin, no overlying redness warmth or signs for infection. Patient is denying any rash to the left lower leg or any groin pain or concern for UTI/urethral discharge or STI. Discussed with patient incidental findings for lymphadenopathy, support symptomatic care with warm compresses and anti-inflammatories and outpatient follow-up with family doctor. Unclear exact etiology for patient's left calf pain however did discuss that an early occult DVT may not be seen on imaging today and will need to follow-up with PCP in 1 to 2 weeks for repeat outpatient serial ultrasound if symptoms persist or worsen. He is comfortable and agreeable this plan. Low clinical suspicion for ischemic limb, compartment syndrome, intra-articular joint infection/septic arthritis, DVT, osteomyelitis, arterial/neurologic injury, necrotizing fasciitis, or infected/rapidly expanding hematoma. Patient is discharged in stable condition. Educated on [address] therapy. May continue weightbearing as tolerated. Encouraged purchasing over-the-counter compression stockings and elevate the lower leg and watch his salt intake. . Patient is instructed to followup with PCP in 3-5 days, sooner with worsened symptoms. Return precautions back to the ED discussed for any new or worsening symptoms. Discussed the importance of follow-up with PCP given his noted elevated nonfasting glucose levels in the ED as well as incidental finding for left inguinal lymphadenopathy. Patient is noted to have elevated blood pressure in the ED without known/diagnosed history of hypertension. He/she is not currently on antihypertensives.   Malka Reynolds denying any chest pain, shortness of breath, headache, dizziness or other signs or symptoms concerning for hypertensive emergency/crisis requiring rapid blood pressure lowering at this time. Had a lengthy discussion with patient regarding blood pressure management and that poorly controlled hypertension can lead to worsening medical conditions and even death including ACS/MI, dissection, stroke, end organ injury, etc.  Strongly encouraged to follow up with PCP and/or cardiologist for blood pressure rechecks and antihypertensive medications. Diagnosis and plan discussed in detail with patient who understands and agrees. Patient agrees to return emergency department if symptoms worsen or any new symptoms develop.       Disposition referral (if applicable):  Othelia Boeck, MD  [id number] [facility]  [phone]    Schedule an appointment as soon as possible for a visit in 2 days  As needed, If symptoms worsen    [facility] Emergency Department  [facility] [phone]  Go to   As needed, If symptoms worsen    Disposition medications (if applicable):  Discharge Medication List as of 9/7/2022 10:21 AM            (Please note that portions of this note may have been completed with a voice recognition program. Efforts were made to edit the dictations but occasionally words are mis-transcribed.)         Silvino Stahl PA-C  09/07/22 1543

## 2022-09-07 NOTE — LETTER
[address] [facility] Emergency Department  [address] [id number]  Phone: [phone]  Fax: [phone]               September 7, 2022    Patient: Cesar Arredondo   YOB: 1970   Date of Visit: 9/7/2022       To Whom It May Concern: Tessa Villatoro was seen and treated in our emergency department on 9/7/2022. He may return to work on 9/8/22. If you have any questions don't hesitate to ask. Thank you!       Sincerely,       Delma Mcgee RN         Signature:__________________________________

## 2023-01-26 ENCOUNTER — HOSPITAL ENCOUNTER (EMERGENCY)
Age: 53
Discharge: HOME | End: 2023-01-26
Attending: EMERGENCY MEDICINE
Payer: COMMERCIAL

## 2023-01-26 ENCOUNTER — APPOINTMENT (OUTPATIENT)
Dept: GENERAL RADIOLOGY | Age: 53
End: 2023-01-26
Payer: COMMERCIAL

## 2023-01-26 VITALS
RESPIRATION RATE: 17 BRPM | HEIGHT: 73 IN | WEIGHT: 183 LBS | DIASTOLIC BLOOD PRESSURE: 92 MMHG | TEMPERATURE: 97.4 F | BODY MASS INDEX: 24.25 KG/M2 | HEART RATE: 84 BPM | OXYGEN SATURATION: 96 % | SYSTOLIC BLOOD PRESSURE: 129 MMHG

## 2023-01-26 DIAGNOSIS — B34.9 VIRAL SYNDROME: Primary | ICD-10-CM

## 2023-01-26 LAB
RAPID INFLUENZA  B AGN: NEGATIVE
RAPID INFLUENZA A AGN: NEGATIVE
SARS-COV-2, NAAT: NOT DETECTED
SOURCE: NORMAL

## 2023-01-26 PROCEDURE — 87635 SARS-COV-2 COVID-19 AMP PRB: CPT

## 2023-01-26 PROCEDURE — 71045 X-RAY EXAM CHEST 1 VIEW: CPT

## 2023-01-26 PROCEDURE — 6370000000 HC RX 637 (ALT 250 FOR IP): Performed by: EMERGENCY MEDICINE

## 2023-01-26 PROCEDURE — 99284 EMERGENCY DEPT VISIT MOD MDM: CPT

## 2023-01-26 PROCEDURE — 87804 INFLUENZA ASSAY W/OPTIC: CPT

## 2023-01-26 RX ORDER — ONDANSETRON 4 MG/1
4 TABLET, FILM COATED ORAL DAILY PRN
Qty: 30 TABLET | Refills: 0 | Status: SHIPPED | OUTPATIENT
Start: 2023-01-26

## 2023-01-26 RX ORDER — ACETAMINOPHEN 500 MG
1000 TABLET ORAL ONCE
Status: COMPLETED | OUTPATIENT
Start: 2023-01-26 | End: 2023-01-26

## 2023-01-26 RX ORDER — ACETAMINOPHEN 500 MG
1000 TABLET ORAL 3 TIMES DAILY
Qty: 60 TABLET | Refills: 0 | Status: SHIPPED | OUTPATIENT
Start: 2023-01-26 | End: 2023-02-05

## 2023-01-26 RX ORDER — GUAIFENESIN AND DEXTROMETHORPHAN HYDROBROMIDE 600; 30 MG/1; MG/1
1 TABLET, EXTENDED RELEASE ORAL 2 TIMES DAILY
Qty: 28 TABLET | Refills: 0 | Status: SHIPPED | OUTPATIENT
Start: 2023-01-26

## 2023-01-26 RX ORDER — IBUPROFEN 600 MG/1
600 TABLET ORAL EVERY 6 HOURS PRN
Qty: 40 TABLET | Refills: 0 | Status: SHIPPED | OUTPATIENT
Start: 2023-01-26 | End: 2023-02-05

## 2023-01-26 RX ORDER — GUAIFENESIN 600 MG/1
600 TABLET, EXTENDED RELEASE ORAL ONCE
Status: COMPLETED | OUTPATIENT
Start: 2023-01-26 | End: 2023-01-26

## 2023-01-26 RX ORDER — ONDANSETRON 4 MG/1
4 TABLET, ORALLY DISINTEGRATING ORAL ONCE
Status: COMPLETED | OUTPATIENT
Start: 2023-01-26 | End: 2023-01-26

## 2023-01-26 RX ADMIN — GUAIFENESIN 600 MG: 600 TABLET, EXTENDED RELEASE ORAL at 18:18

## 2023-01-26 RX ADMIN — ONDANSETRON 4 MG: 4 TABLET, ORALLY DISINTEGRATING ORAL at 18:18

## 2023-01-26 RX ADMIN — ACETAMINOPHEN 1000 MG: 500 TABLET ORAL at 18:18

## 2023-01-26 NOTE — LETTER
[facility] Emergency Department  [address] [address]  Phone: [phone]  Fax: [phone]               January 26, 2023    Patient: Janay Russell   YOB: 1970   Date of Visit: 1/26/2023       To Whom It May Concern: Sharla Francis was seen and treated in our emergency department on 1/26/2023. He may return to work on when symptoms have improved. Please allow Caroline Thompson time to recover. .      Sincerely,       Tad Swanson MD         Signature:__________________________________

## 2023-01-28 NOTE — ED PROVIDER NOTES
Triage Chief Complaint:   Influenza (Symptoms of flu x 3 days)    [place]:  Gerber Cary is a 50 y.o. male that presents with flulike symptoms for the past 3 days. Patient reports that he thinks that he has the flu. Patient has been having cough, congestion and nausea as well as body aches. Patient denies any known sick contacts or recent hospitalizations. Patient is not a smoker. No known lung disease.     ROS:  General:  No fevers, no chills, no weakness  Eyes:  No recent vison changes, no discharge  ENT:  No sore throat, + nasal congestion, no hearing changes  Cardiovascular:  No chest pain, no palpitations  Respiratory:  No shortness of breath, + cough, no wheezing  Gastrointestinal:  No pain, + nausea, no vomiting, no diarrhea  Musculoskeletal:  + muscle pain/body aches, no joint pain  Skin:  No rash, no pruritis, no easy bruising  Neurologic:  No speech problems, no headache, no extremity numbness, no extremity tingling, no extremity weakness  Psychiatric:  No anxiety  Genitourinary:  No dysuria, no hematuria  Endocrine:  No unexpected weight gain, no unexpected weight loss  Extremities:  no edema, no pain    Past Medical History:   Diagnosis Date    Back pain 1990    Diabetes mellitus, type 2 ([facility] [address] 5/14/2014    Hyperlipidemia     Kidney stones 4/2011     Past Surgical History:   Procedure Laterality Date    VASECTOMY  1/2009     Family History   Problem Relation Age of Onset    Diabetes Father     Hypertension Father     Heart Disease Father     Stroke Father     Liver Disease Father     Obesity Father     Glaucoma Father      Social History     Socioeconomic History    Marital status:      Spouse name: Not on file    Number of children: Not on file    Years of education: Not on file    Highest education level: Not on file   Occupational History    Not on file   Tobacco Use    Smoking status: Never    Smokeless tobacco: Never   Substance and Sexual Activity    Alcohol use: Not on file    Drug use: Never    Sexual activity: Not on file   Other Topics Concern    Not on file   Social History Narrative    Not on file     Social Determinants of Health     Financial Resource Strain: Not on file   Food Insecurity: Not on file   Transportation Needs: Not on file   Physical Activity: Not on file   Stress: Not on file   Social Connections: Not on file   Intimate Partner Violence: Not on file   Housing Stability: Not on file     Current Facility-Administered Medications   Medication Dose Route Frequency Provider Last Rate Last Admin    triamcinolone acetonide (KENALOG-40) injection 80 mg  80 mg Intra-artICUlar Once Shivam Ferro MD         Current Outpatient Medications   Medication Sig Dispense Refill    Dextromethorphan-guaiFENesin (MUCINEX DM)  MG TB12 Take 1 tablet by mouth 2 times daily 28 tablet 0    acetaminophen (TYLENOL) 500 MG tablet Take 2 tablets by mouth in the morning, at noon, and at bedtime for 10 days 60 tablet 0    ibuprofen (IBU) 600 MG tablet Take 1 tablet by mouth every 6 hours as needed for Fever or Pain 40 tablet 0    ondansetron (ZOFRAN) 4 MG tablet Take 1 tablet by mouth daily as needed for Nausea or Vomiting 30 tablet 0    cyclobenzaprine (FLEXERIL) 10 mg tablet Take 1 tablet by mouth 3 times daily as needed for Muscle spasms 15 tablet 0    naproxen (NAPROSYN) 500 MG tablet Take 1 tablet by mouth 2 times daily 60 tablet 0    doxycycline (MONODOX) 100 MG capsule Take 1 capsule by mouth 2 times daily  0    glimepiride (AMARYL) 4 MG tablet Take 1 tablet by mouth every morning 30 tablet 0    metFORMIN (GLUCOPHAGE) 500 MG tablet Take 1 tablet by mouth 2 times daily (with meals) 60 tablet 0    glucose blood VI test strips (FREESTYLE LITE) strip Apply 1 each topically 2 times daily 100 each 11    Lancets MISC Test 3 times per day 100 each 11    naproxen (NAPROSYN) 500 MG tablet Take 1 tablet by mouth 2 times daily (with meals) 60 tablet 0    Alcohol Swabs 70 % PADS Use 3 times per day 100 each 11    glucose monitoring kit (FREESTYLE) monitoring kit 1 kit by Does not apply route daily as needed. 1 kit 0     Allergies   Allergen Reactions    Amoxicillin     Ciprofloxacin     Levofloxacin     Penicillins     Sulfa Antibiotics     Sulfate        Nursing Notes Reviewed    Physical Exam:  ED Triage Vitals [01/26/23 1734]   Enc Vitals Group      BP (!) 156/93      Heart Rate (!) 101      Resp 20      Temp 97.4 °F (36.3 °C)      Temp Source Oral      SpO2 98 %      Weight 183 lb (83 kg)      Height 6' 1\" (1.854 m)      Head Circumference       Peak Flow       Pain Score       Pain Loc       Pain Edu? Excl. in [address]? My pulse ox interpretation is - normal    General appearance:  No acute distress. Appears not to be feeling well. Skin:  Warm. Dry. Eye:  Extraocular movements intact. Ears, nose, mouth and throat:  Oral mucosa moist   Neck:  Trachea midline. Extremity:  No swelling. Normal ROM     Heart:  Regular rate and rhythm, normal S1 & S2, no extra heart sounds. Perfusion:  Intact. Respiratory:  Lungs clear to auscultation bilaterally. Respirations nonlabored. Speaking clearly in full sentences. No respiratory distress. Abdominal:  Normal bowel sounds. Soft. Nontender. Non distended. Back:  No CVA tenderness to palpation     Neurological:  Alert and oriented times 3. No focal neuro deficits.              Psychiatric:  Appropriate    I have reviewed and interpreted all of the currently available lab results from this visit (if applicable):  Results for orders placed or performed during the hospital encounter of 01/26/23   Rapid Flu Swab    Specimen: Nasopharyngeal   Result Value Ref Range    Rapid Influenza A Ag NEGATIVE NEGATIVE    Rapid Influenza B Ag NEGATIVE NEGATIVE   COVID-19, Rapid    Specimen: Nasopharyngeal   Result Value Ref Range    Source UNKNOWN     SARS-CoV-2, NAAT NOT DETECTED NOT DETECTED      Radiographs (if obtained):  [] The following radiograph was interpreted by myself in the absence of a radiologist:   [x] Radiologist's Report Reviewed:  XR CHEST PORTABLE   Final Result   No acute cardiopulmonary process. EKG (if obtained): (All EKG's are interpreted by myself in the absence of a cardiologist)    Chart review shows recent radiographs:  XR CHEST PORTABLE    Result Date: 1/26/2023  EXAMINATION: ONE XRAY VIEW OF THE CHEST 1/26/2023 6:13 pm COMPARISON: June 7, 2020. HISTORY: ORDERING SYSTEM PROVIDED HISTORY: cough, chills, tachycardia TECHNOLOGIST PROVIDED HISTORY: Reason for exam:->cough, chills, tachycardia Reason for Exam: cough, chills, tachycardia Additional signs and symptoms: na Relevant Medical/Surgical History: diabetes FINDINGS: Frontal portable view of the chest.  Normal lung volume. No focal airspace disease. Normal pulmonary vasculature. No pleural effusion or pneumothorax. Normal cardiomediastinal silhouette and great vessels. No acute osseous abnormality. No acute cardiopulmonary process. MDM:  CC/HPI Summary, DDx, ED Course, and Reassessment:  Pt presents as above. Emergent conditions considered. Presentation prompted initial chest x-ray, COVID and flu swabs. Chest x-ray negative for acute cardiopulmonary process. Flu and COVID swabs negative. Patient is here with likely viral syndrome. Patient is overall hemodynamically stable and well-appearing with stable vital signs on room air. Patient appears well-hydrated and is tolerating p.o. Patient currently does not meet inpatient criteria for further evaluation and treatment and is appropriate for further outpatient follow-up and close monitoring of symptoms. I did encourage patient to isolate until symptoms resolve. Patient encouraged to return to the emergency department for any new or worsening symptoms including increasing chest pain or shortness of breath or inability to tolerate p.o.     History from : Patient    Limitations to history : None    Patient was given the following medications:  Medications   guaiFENesin (MUCINEX) extended release tablet 600 mg (600 mg Oral Given 1/26/23 1818)   acetaminophen (TYLENOL) tablet 1,000 mg (1,000 mg Oral Given 1/26/23 1818)   ondansetron (ZOFRAN-ODT) disintegrating tablet 4 mg (4 mg Oral Given 1/26/23 1818)       Independent Imaging Interpretation by me: CXR    Chronic conditions affecting care: NA    Discussion with Other Profesionals : None    Social Determinants : None    Records Reviewed : None    Disposition Considerations (tests considered but not done, Shared Decision Making, Pt Expectation of Test or Tx.):     Appropriate for outpatient management      I discussed specific signs and symptoms on when to return to the emergency department as well as the need for close outpatient follow-up. Questions sought and answered with the patient. They voice understanding and agree with plan. I am the Primary Clinician of Record. Is this patient to be included in the SEP-1 Core Measure due to severe sepsis or septic shock? No   Exclusion criteria - the patient is NOT to be included for SEP-1 Core Measure due to:  Viral etiology found or highly suspected (including COVID-19) without concomitant bacterial infection          Care of this patient did occur during COVID-19 pandemic. Clinical Impression:  1.  Viral syndrome      Disposition referral (if applicable):  Jean Pierre Dubon MD  [id number]  [phone]    Call   PLEASE CALL IN AM TO UPDATE AND TO SCHEDULE ER FOLLOW UP [address] [place] [address] Emergency Department  [facility] [phone]  Today  If symptoms worsen    Disposition medications (if applicable):  Discharge Medication List as of 1/26/2023  7:46 PM        START taking these medications    Details   Dextromethorphan-guaiFENesin (MUCINEX DM)  MG TB12 Take 1 tablet by mouth 2 times daily, Disp-28 tablet, R-0Normal      ibuprofen (IBU) 600 MG tablet Take 1 tablet by mouth every 6 hours as needed for Fever or Pain, Disp-40 tablet, R-0Normal      ondansetron (ZOFRAN) 4 MG tablet Take 1 tablet by mouth daily as needed for Nausea or Vomiting, Disp-30 tablet, R-0Normal             Comment: Please note this report has been produced using speech recognition software and may contain errors related to that system including errors in grammar, punctuation, and spelling, as well as words and phrases that may be inappropriate. If there are any questions or concerns please feel free to contact the dictating provider for clarification.        Stalin Kelsey MD  01/28/23 0020